# Patient Record
Sex: FEMALE | Race: WHITE
[De-identification: names, ages, dates, MRNs, and addresses within clinical notes are randomized per-mention and may not be internally consistent; named-entity substitution may affect disease eponyms.]

---

## 2019-03-04 ENCOUNTER — HOSPITAL ENCOUNTER (EMERGENCY)
Dept: HOSPITAL 97 - ER | Age: 34
Discharge: HOME | End: 2019-03-04
Payer: SELF-PAY

## 2019-03-04 VITALS — SYSTOLIC BLOOD PRESSURE: 135 MMHG | DIASTOLIC BLOOD PRESSURE: 90 MMHG | TEMPERATURE: 100.3 F | OXYGEN SATURATION: 100 %

## 2019-03-04 DIAGNOSIS — J06.9: Primary | ICD-10-CM

## 2019-03-04 DIAGNOSIS — J10.1: ICD-10-CM

## 2019-03-04 PROCEDURE — 87081 CULTURE SCREEN ONLY: CPT

## 2019-03-04 PROCEDURE — 87070 CULTURE OTHR SPECIMN AEROBIC: CPT

## 2019-03-04 PROCEDURE — 99283 EMERGENCY DEPT VISIT LOW MDM: CPT

## 2019-03-04 PROCEDURE — 96372 THER/PROPH/DIAG INJ SC/IM: CPT

## 2019-03-04 PROCEDURE — 87804 INFLUENZA ASSAY W/OPTIC: CPT

## 2019-03-04 NOTE — EDPHYS
Physician Documentation                                                                           

 [facility]                                                                

Name: Clau Colin                                                                              

Age: 33 yrs                                                                                       

Sex: Female                                                                                       

: 1985                                                                                   

MRN: [id number]                                                                                   

Arrival Date: 2019                                                                          

Time: 17:28                                                                                       

Account#: [id number]                                                                            

Bed 10                                                                                            

Private MD:                                                                                       

ED Physician Delvin Sanford                                                                    

HPI:                                                                                              

                                                                                             

17:55 This 33 yrs old  Female presents to ER via Ambulatory with complaints of Flu   ma2 

      Symptoms.                                                                                   

17:55 The patient or guardian reports cough. Onset: The symptoms/episode began/occurred       ma2 

      gradually, 2 day(s) ago. Severity of symptoms: At their worst the symptoms were             

      moderate, in the emergency department the symptoms are unchanged. Associated signs and      

      symptoms: Pertinent positives: rhinorrhea, sore throat, Pertinent negatives: diarrhea,      

      fever, nausea. The patient has not experienced similar symptoms in the past.                

                                                                                                  

Historical:                                                                                       

- Allergies:                                                                                      

17:39 No Known Allergies;                                                                     ss  

- Home Meds:                                                                                      

17:39 None [Active];                                                                          ss  

- PMHx:                                                                                           

17:39 ectopic pregnancy; pituitary tumor;                                                     ss  

- PSHx:                                                                                           

17:39 Appendectomy;                                                                           ss  

                                                                                                  

- Immunization history:: Adult Immunizations up to date.                                          

- Social history:: Smoking status: Patient/guardian denies using tobacco,                         

  Patient/guardian denies using alcohol, street drugs, The patient lives with family.             

- Ebola Screening: : Patient denies exposure to infectious person Patient denies travel           

  to an Ebola-affected area in the 21 days before illness onset.                                  

- Family history:: not pertinent.                                                                 

                                                                                                  

                                                                                                  

ROS:                                                                                              

17:55 Constitutional: Negative for fever, chills, and weight loss.                            ma2 

17:55 ENT: Positive for nasal discharge, Negative for foreign body sensation, hearing loss.       

17:55 All other systems are negative.                                                             

                                                                                                  

Exam:                                                                                             

17:55 Constitutional:  This is a well developed, well nourished patient who is awake, alert,  ma2 

      and in no acute distress. Head/Face:  Normocephalic, atraumatic.                            

17:55 Chest/axilla:  Normal chest wall appearance and motion.  Nontender with no deformity.       

      No lesions are appreciated. Cardiovascular:  Regular rate and rhythm with a normal S1       

      and S2.  No gallops, murmurs, or rubs.  Normal PMI, no JVD.  No pulse deficits.             

      Respiratory:  Lungs have equal breath sounds bilaterally, clear to auscultation and         

      percussion.  No rales, rhonchi or wheezes noted.  No increased work of breathing, no        

      retractions or nasal flaring. Abdomen/GI:  Soft, non-tender, with normal bowel sounds.      

      No distension or tympany.  No guarding or rebound.  No evidence of tenderness               

      throughout. Neuro:  Awake and alert, GCS 15, oriented to person, place, time, and           

      situation.  Cranial nerves II-XII grossly intact.  Motor strength 5/5 in all                

      extremities.  Sensory grossly intact.  Cerebellar exam normal.  Normal gait.                

17:55 ENT: TM's: are normal, Nose: is normal, Mouth: is normal, Posterior pharynx: Tonsils:       

      bilaterally enlarged, with exudate, swelling, erythema, that is mild, peritonsillar         

      mass, is not appreciated, pooling of secretions, is not appreciated.                        

                                                                                                  

Vital Signs:                                                                                      

17:39  / 90; Pulse 121; Resp 17; Temp 100.3(O); Pulse Ox 100% on R/A; Weight 83.91 kg;  ss  

      Height 5 ft. 7 in. (170.18 cm); Pain 10/10;                                                 

19:11 Pulse 102; Resp 18; Pulse Ox 100% on R/A;                                               aj1 

17:39 Body Mass Index 28.97 (83.91 kg, 170.18 cm)                                             ss  

                                                                                                  

MDM:                                                                                              

17:41 Patient medically screened.                                                             [facility] 

17:55 Differential Diagnosis: Influenza Upper Respiratory Infection Sinusitis. Data reviewed: [facility] 

      vital signs, nurses notes, radiologic studies. Counseling: I had a detailed discussion      

      with the patient and/or guardian regarding: the historical points, exam findings, and       

      any diagnostic results supporting the discharge/admit diagnosis, the presence of at         

      least one elevated blood pressure reading (>120/80) during this emergency department        

      visit, the need for outpatient follow up. Response to treatment: the patient's symptoms     

      have markedly improved after treatment.                                                     

                                                                                                  

                                                                                             

17:41 Order name: Flu; Complete Time: 18:50                                                   ma2 

                                                                                             

17:41 Order name: Strep; Complete Time: 18:50                                                 ma2 

                                                                                             

18:39 Order name: Throat Culture                                                              EDMS

                                                                                                  

Administered Medications:                                                                         

18:11 Drug: TORadol 60 mg Route: IM; Site: right gluteus;                                     aj1 

19:11 Follow up: Response: No adverse reaction                                                [facility] 

18:11 Drug: MethylPREDNISolone Sodium Succinate 125 mg Route: IM; Site: left gluteus;         aj1 

19:11 Follow up: Response: No adverse reaction                                                aj1 

                                                                                                  

                                                                                                  

Disposition:                                                                                      

19 18:50 Discharged to Home. Impression: Acute upper respiratory infection, unspecified,    

  Influenza due to certain identified influenza viruses.                                          

- Condition is Stable.                                                                            

- Discharge Instructions: Influenza, Adult, Upper Respiratory Infection, Adult.                   

- Prescriptions for Amoxicillin 125 mg/5 mL Oral Suspension for Reconstitution - take 5           

  milliliter by ORAL route every 8 hours for 10 days; 150 milliliter. Tylenol- Codeine            

  #3 300-30 mg Oral Tablet - take 2 tablet by ORAL route every 6 hours As needed; 30              

  tablet. Medrol (Inder) 4 mg Oral Tablets, Dose Pack - take 1 tablet by ORAL route as              

  directed - follow package instructions; 1 packet. Tamiflu 75 mg Oral Capsule - take 1           

  tablet by ORAL route every 12 hours for 5 days; 10 tablet.                                      

- Medication Reconciliation Form, Thank You Letter, Antibiotic Education, Prescription            

  Opioid Use form.                                                                                

- Follow up: Private Physician; When: Tomorrow; Reason: Continuance of care.                      

                                                                                                  

                                                                                                  

                                                                                                  

Signatures:                                                                                       

[facility]                                                 

Estelle Mckeon RN RN   aj1                                                  

Mounika Dalton RN RN                                                      

Delvin Sanford MD MD   ma2                                                  

                                                                                                  

Corrections: (The following items were deleted from the chart)                                    

18:50 18:50 2019 18:50 Discharged to Home. Impression: Acute upper respiratory          ma2 

      infection, unspecified. Condition is Stable. Discharge Instructions: Upper Respiratory      

      Infection, Adult. Prescriptions for Amoxicillin 125 mg/5 mL Oral Suspension for             

      Reconstitution - take 5 milliliter by ORAL route every 8 hours for 10 days; 150             

      milliliter, Tylenol-Codeine #3 300-30 mg Oral Tablet - take 2 tablet by ORAL route          

      every 6 hours As needed; 30 tablet, Medrol (Inder) 4 mg Oral Tablets, Dose Pack - take 1      

      tablet by ORAL route as directed - follow package instructions; 1 packet. and Forms are     

      Medication Reconciliation Form, Thank You Letter, Antibiotic Education, Prescription        

      Opioid Use. Follow up: Private Physician; When: Tomorrow; Reason: Continuance of care.      

      ma2                                                                                         

19:12 18:50 2019 18:50 Discharged to Home. Impression: Acute upper respiratory          aj1 

      infection, unspecified; Influenza due to certain identified influenza viruses.              

      Condition is Stable. Discharge Instructions: Upper Respiratory Infection, Adult.            

      Prescriptions for Amoxicillin 125 mg/5 mL Oral Suspension for Reconstitution - take 5       

      milliliter by ORAL route every 8 hours for 10 days; 150 milliliter, Tylenol-Codeine #3      

      300-30 mg Oral Tablet - take 2 tablet by ORAL route every 6 hours As needed; 30 tablet,     

      Medrol (Inder) 4 mg Oral Tablets, Dose Pack - take 1 tablet by ORAL route as directed -       

      follow package instructions; 1 packet. and Forms are Medication Reconciliation Form,        

      Thank You Letter, Antibiotic Education, Prescription Opioid Use. Follow up: Private         

      Physician; When: Tomorrow; Reason: Continuance of care. ma2                                 

                                                                                                  

**************************************************************************************************

## 2019-03-04 NOTE — ER
Nurse's Notes                                                                                     

 [facility]                                                                

Name: Clau Colin                                                                              

Age: 33 yrs                                                                                       

Sex: Female                                                                                       

: 1985                                                                                   

MRN: [id number]                                                                                   

Arrival Date: 2019                                                                          

Time: 17:28                                                                                       

Account#: [id number]                                                                            

Bed 10                                                                                            

Private MD:                                                                                       

Diagnosis: Acute upper respiratory infection, unspecified;Influenza due to certain identified     

  influenza viruses                                                                               

                                                                                                  

Presentation:                                                                                     

                                                                                             

17:37 Presenting complaint: Patient states: fever, cough, body aches and chills that began    ss  

      yesterday. Transition of care: patient was not received from another setting of care.       

      Onset of symptoms was March 3, 2019. Risk Assessment: Do you want to hurt yourself or       

      someone else? Patient reports no desire to harm self or others. Initial Sepsis Screen:      

      Does the patient meet any 2 criteria? No. Patient's initial sepsis screen is negative.      

      Does the patient have a suspected source of infection? No. Patient's initial sepsis         

      screen is negative. Note Pt reports she has not taken any antipyretics today because        

      she believes they are not working for her. Care prior to arrival: None.                     

17:37 Method Of Arrival: Ambulatory                                                           ss  

17:37 Acuity: RIC 4                                                                           ss  

                                                                                                  

Historical:                                                                                       

- Allergies:                                                                                      

17:39 No Known Allergies;                                                                     ss  

- Home Meds:                                                                                      

17:39 None [Active];                                                                          ss  

- PMHx:                                                                                           

17:39 ectopic pregnancy; pituitary tumor;                                                     ss  

- PSHx:                                                                                           

17:39 Appendectomy;                                                                           ss  

                                                                                                  

- Immunization history:: Adult Immunizations up to date.                                          

- Social history:: Smoking status: Patient/guardian denies using tobacco,                         

  Patient/guardian denies using alcohol, street drugs, The patient lives with family.             

- Ebola Screening: : Patient denies exposure to infectious person Patient denies travel           

  to an Ebola-affected area in the 21 days before illness onset.                                  

- Family history:: not pertinent.                                                                 

                                                                                                  

                                                                                                  

Screenin:18 Abuse screen: Denies threats or abuse. Denies injuries from another. Nutritional        aj1 

      screening: No deficits noted. Tuberculosis screening: No symptoms or risk factors           

      identified.                                                                                 

                                                                                                  

Assessment:                                                                                       

18:18 General: Appears in no apparent distress. uncomfortable, Behavior is calm, cooperative, aj1 

      appropriate for age. Pain: Complains of pain in left aspect of posterior pharynx and        

      right aspect of posterior pharynx. Neuro: Level of Consciousness is awake, alert, obeys     

      commands. Cardiovascular: Patient's skin is warm and dry. Respiratory: Airway is patent     

      Respiratory effort is even, unlabored, Respiratory pattern is regular, symmetrical,         

      Breath sounds are clear bilaterally. Respiratory: Reports cough that is hacking,            

      persistent. GI: No signs and/or symptoms were reported involving the gastrointestinal       

      system. : No signs and/or symptoms were reported regarding the genitourinary system.      

      EENT: Throat is reddened has patchy exudate has enlarged tonsils bilaterally. Derm: No      

      signs and/or symptoms reported regarding the dermatologic system. Skin is pink, warm \T\    

      dry. normal. Musculoskeletal: No signs and/or symptoms reported regarding the               

      musculoskeletal system. Circulation, motion, and sensation intact.                          

19:11 Reassessment: Patient appears in no apparent distress at this time. No changes from     aj1 

      previously documented assessment. Patient and/or family updated on plan of care and         

      expected duration. Pain level reassessed. Patient is alert, oriented x 3, equal             

      unlabored respirations, skin warm/dry/pink.                                                 

                                                                                                  

Vital Signs:                                                                                      

17:39  / 90; Pulse 121; Resp 17; Temp 100.3(O); Pulse Ox 100% on R/A; Weight 83.91 kg;  ss  

      Height 5 ft. 7 in. (170.18 cm); Pain 10/10;                                                 

19:11 Pulse 102; Resp 18; Pulse Ox 100% on R/A;                                               aj1 

17:39 Body Mass Index 28.97 (83.91 kg, 170.18 cm)                                               

                                                                                                  

ED Course:                                                                                        

17:28 Patient arrived in ED.                                                                  as  

17:38 Triage completed.                                                                       ss  

17:39 Arm band placed on right wrist.                                                         ss  

17:41 Delvin Sanford MD is Attending Physician.                                           ma2 

17:46 Estelle Mckeon, RN is Primary Nurse.                                                   aj1 

18:18 Patient has correct armband on for positive identification. Bed in low position. Call   aj1 

      light in reach. Side rails up X 1.                                                          

18:18 No provider procedures requiring assistance completed.                                  aj1 

19:11 Patient did not have IV access during this emergency room visit.                        aj1 

                                                                                                  

Administered Medications:                                                                         

18:11 Drug: TORadol 60 mg Route: IM; Site: right gluteus;                                     aj1 

19:11 Follow up: Response: No adverse reaction                                                aj1 

18:11 Drug: MethylPREDNISolone Sodium Succinate 125 mg Route: IM; Site: left gluteus;         aj1 

19:11 Follow up: Response: No adverse reaction                                                aj1 

                                                                                                  

                                                                                                  

Outcome:                                                                                          

18:50 Discharge ordered by MD.                                                                ma2 

19:12 Discharged to home ambulatory.                                                          aj1 

19:12 Condition: good                                                                             

19:12 Discharge instructions given to patient, Instructed on discharge instructions, follow       

      up and referral plans. no drinking with medication, no driving heavy equipment,             

      medication usage, Demonstrated understanding of instructions, follow-up care,               

      medications, Prescriptions given X 4.                                                       

19:12 Patient left the ED.                                                                    aj1 

                                                                                                  

Signatures:                                                                                       

Estelle Mckeon RN RN   aj1                                                  

Rosi Pereira Shelby, RN RN                                                      

Delvin Sanford MD MD ma2                                                  

                                                                                                  

**************************************************************************************************

## 2022-01-09 ENCOUNTER — HOSPITAL ENCOUNTER (EMERGENCY)
Dept: HOSPITAL 97 - ER | Age: 37
Discharge: HOME | End: 2022-01-09
Payer: SELF-PAY

## 2022-01-09 VITALS — DIASTOLIC BLOOD PRESSURE: 80 MMHG | SYSTOLIC BLOOD PRESSURE: 130 MMHG | OXYGEN SATURATION: 100 % | TEMPERATURE: 98.4 F

## 2022-01-09 DIAGNOSIS — U07.1: Primary | ICD-10-CM

## 2022-01-09 DIAGNOSIS — Z88.6: ICD-10-CM

## 2022-01-09 LAB — SARS-COV-2 RNA RESP QL NAA+PROBE: POSITIVE

## 2022-01-09 PROCEDURE — 87081 CULTURE SCREEN ONLY: CPT

## 2022-01-09 PROCEDURE — 0240U: CPT

## 2022-01-09 PROCEDURE — 87070 CULTURE OTHR SPECIMN AEROBIC: CPT

## 2022-01-09 PROCEDURE — 99283 EMERGENCY DEPT VISIT LOW MDM: CPT

## 2022-01-09 NOTE — ER
Nurse's Notes                                                                                     

 [facility]                                                                 

Name: Clau Colin                                                                              

Age: 36 yrs                                                                                       

Sex: Female                                                                                       

: 1985                                                                                   

MRN: [id number]                                                                                   

Arrival Date: 2022                                                                          

Time: 11:22                                                                                       

Account#: [id number]                                                                            

Bed 10                                                                                            

Private MD:                                                                                       

Diagnosis: Coronavirus infection, unspecified                                                     

                                                                                                  

Presentation:                                                                                     

                                                                                             

11:38 Chief complaint: Patient states: she has had a sore throat, pain when swallowing and    ap3 

      speaking, ear pain, chills, body aches and dry cough for approx 5 days. Coronavirus         

      screen: Vaccine status: Patient reports receiving the 1st dose of the Covid vaccine.        

      chills, cough unrelated to allergies, fatigue, runny nose, sore throat, Client presents     

      with at least one sign or symptom that may indicate coronavirus-19. Standard/surgical       

      mask placed on the client. Provider contacted for isolation considerations. Ebola           

      Screen: No symptoms or risks identified at this time. Initial Sepsis Screen: Does the       

      patient meet any 2 criteria? No. Patient's initial sepsis screen is negative. Does the      

      patient have a suspected source of infection? No. Patient's initial sepsis screen is        

      negative. Risk Assessment: Do you want to hurt yourself or someone else? Patient            

      reports no desire to harm self or others. Onset of symptoms was 2022.           

11:38 Method Of Arrival: Ambulatory                                                           ap3 

11:38 Acuity: RIC 4                                                                           ap3 

                                                                                                  

Triage Assessment:                                                                                

11:40 General: Appears comfortable, Behavior is calm, cooperative, appropriate for age. Pain: ap3 

      Complains of pain in throat, ear, and generalized body aches. EENT: Reports nasal           

      congestion pain when swallowing. EENT:. Neuro: Level of Consciousness is awake, alert,      

      obeys commands, Oriented to person, place, time, situation, Appropriate for age Gait is     

      steady, Speech is normal. Respiratory: Reports cough that is dry, Airway is patent          

      Respiratory effort is even, unlabored, Respiratory pattern is regular, symmetrical.         

                                                                                                  

OB/GYN:                                                                                           

11:42 LMP 12/10/2021                                                                          ap3 

                                                                                                  

Historical:                                                                                       

- Allergies:                                                                                      

11:40 Aspirin;                                                                                ap3 

- PMHx:                                                                                           

11:40 ectopic pregnancy; pituitary tumor;                                                     ap3 

- PSHx:                                                                                           

11:40 None;                                                                                   ap3 

                                                                                                  

- Immunization history:: Adult Immunizations up to date.                                          

- Social history:: Smoking status: Patient denies any tobacco usage or history of.                

  Patient uses alcohol, only on a social basis.                                                   

                                                                                                  

                                                                                                  

Screenin:42 Abuse screen: Denies threats or abuse. Nutritional screening: No deficits noted.        ap3 

      Tuberculosis screening: No symptoms or risk factors identified. Fall Risk None              

      identified.                                                                                 

                                                                                                  

Vital Signs:                                                                                      

11:38  / 80; Pulse 77; Resp 18; Temp 98.4; Pulse Ox 100% ; Weight 90.72 kg; Height 5    ap3 

      ft. 8 in. (172.72 cm);                                                                      

11:38 Body Mass Index 30.41 (90.72 kg, 172.72 cm)                                             ap3 

                                                                                                  

ED Course:                                                                                        

11:22 Patient arrived in ED.                                                                  am2 

11:34 Justin Busyb PA is PHCP.                                                              [email or web address] 

11:34 Delvin Sanford MD is Attending Physician.                                           [facility] 

11:35 Cece Duke, RN is Primary Nurse.                                                  ap3 

11:40 Triage completed.                                                                       ap3 

11:42 Arm band placed on right wrist.                                                         ap3 

11:43 Patient has correct armband on for positive identification. Placed in gown. Call light  ap3 

      in reach. Pulse ox on. NIBP on. Door closed. Noise minimized.                               

13:30 No provider procedures requiring assistance completed. Patient did not have IV access   ap3 

      during this emergency room visit.                                                           

                                                                                                  

Administered Medications:                                                                         

No medications were administered                                                                  

                                                                                                  

                                                                                                  

Outcome:                                                                                          

13:18 Discharge ordered by MD.                                                                [facility] 

13:30 Discharged to home ambulatory.                                                          ap3 

13:30 Condition: good                                                                             

13:30 Discharge instructions given to patient, Instructed on discharge instructions,              

      Demonstrated understanding of instructions, follow-up care.                                 

13:31 Patient left the ED.                                                                    ap3 

                                                                                                  

Signatures:                                                                                       

Justin Busby PA PA jmm Moreno, Amanda                               amCece Barrett, RN                    RN   ap3                                                  

                                                                                                  

**************************************************************************************************

## 2022-01-09 NOTE — EDPHYS
Physician Documentation                                                                           

 [facility]                                                                 

Name: Clau Colin                                                                              

Age: 36 yrs                                                                                       

Sex: Female                                                                                       

: 1985                                                                                   

MRN: [id number]                                                                                   

Arrival Date: 2022                                                                          

Time: 11:22                                                                                       

Account#: [id number]                                                                            

Bed 10                                                                                            

Private MD:                                                                                       

ED Physician Delvin Sanford                                                                    

HPI:                                                                                              

                                                                                             

13:01 This 36 yrs old  Female presents to ER via Ambulatory with complaints of        jmm 

      Difficulty Swallowing, Neck Pain, <24hrs Old, Ear Pain, Cough.                              

13:01 The patient presents with sore throat. Onset: The symptoms/episode began/occurred       jmm 

      gradually, 3 day(s) ago. Modifying factors: The symptoms are alleviated by nothing, the     

      symptoms are aggravated by swallowing. Associated signs and symptoms: Pertinent             

      positives: cough. Associated signs and symptoms: Pertinent positives: earache. The          

      patient has experienced similar episodes in the past.                                       

                                                                                                  

OB/GYN:                                                                                           

11:42 LMP 12/10/2021                                                                          ap3 

                                                                                                  

Historical:                                                                                       

- Allergies:                                                                                      

11:40 Aspirin;                                                                                ap3 

- PMHx:                                                                                           

11:40 ectopic pregnancy; pituitary tumor;                                                     ap3 

- PSHx:                                                                                           

11:40 None;                                                                                   ap3 

                                                                                                  

- Immunization history:: Adult Immunizations up to date.                                          

- Social history:: Smoking status: Patient denies any tobacco usage or history of.                

  Patient uses alcohol, only on a social basis.                                                   

                                                                                                  

                                                                                                  

ROS:                                                                                              

13:01 Constitutional: Positive for body aches, chills.                                        jmm 

13:01 ENT: Positive for sore throat.                                                              

13:01 Respiratory: Positive for cough.                                                            

13:01 All other systems are negative.                                                             

                                                                                                  

Exam:                                                                                             

13:01 Constitutional:  This is a well developed, well nourished patient who is awake, alert,  jmm 

      and in no acute distress. Head/Face:  atraumatic. Eyes:  EOMI, no conjunctival erythema     

      appreciated                                                                                 

13:01 Neck:  Trachea midline, Supple Chest/axilla:  Normal chest wall appearance and motion.      

       Cardiovascular:  Regular rate and rhythm.  No edema appreciated Respiratory:  Normal       

      respirations, no respiratory distress appreciated Abdomen/GI:  Non distended, soft          

      Back:  Normal ROM Skin:  General appearance color normal MS/ Extremity:  Moves all          

      extremities, no obvious deformities appreciated, no edema noted to the lower                

      extremities  Neuro:  Awake and alert, normal gait Psych:  Behavior is normal, Mood is       

      normal, Patient is cooperative and pleasant                                                 

13:01 ENT: TM's: are normal, Posterior pharynx: erythema, that is mild.                           

                                                                                                  

Vital Signs:                                                                                      

11:38  / 80; Pulse 77; Resp 18; Temp 98.4; Pulse Ox 100% ; Weight 90.72 kg; Height 5    ap3 

      ft. 8 in. (172.72 cm);                                                                      

11:38 Body Mass Index 30.41 (90.72 kg, 172.72 cm)                                             ap3 

                                                                                                  

MDM:                                                                                              

11:58 Patient medically screened.                                                             [facility] 

13:11 Data reviewed: vital signs, nurses notes. Counseling: I had a detailed discussion with  lyudmila 

      the patient and/or guardian regarding: the historical points, exam findings, and any        

      diagnostic results supporting the discharge/admit diagnosis, lab results, the need for      

      outpatient follow up, to return to the emergency department if symptoms worsen or           

      persist or if there are any questions or concerns that arise at home.                       

                                                                                                  

                                                                                             

11:36 Order name: Strep; Complete Time: 12:50                                                 ap3 

                                                                                             

11:36 Order name: COVID-19/FLU A+B (Document "Date of Onset" if Symptomatic); Complete Time:  ap3 

      13:19                                                                                       

                                                                                             

12:50 Order name: Throat Culture                                                              EDMS

                                                                                                  

Administered Medications:                                                                         

No medications were administered                                                                  

                                                                                                  

                                                                                                  

Disposition Summary:                                                                              

22 13:18                                                                                    

Discharge Ordered                                                                                 

      Location: Home                                                                          [facility] 

      Condition: Stable                                                                       [facility] 

      Diagnosis                                                                                   

        - Coronavirus infection, unspecified                                                  [facility] 

      Followup:                                                                               [facility] 

        - With: Private Physician                                                                  

        - When: 2 - 3 days                                                                         

        - Reason: Recheck today's complaints, Continuance of care, Re-evaluation by your           

      physician                                                                                   

      Discharge Instructions:                                                                     

        - Discharge Summary Sheet                                                             [facility] 

        - COVID-19                                                                            [facility] 

      Forms:                                                                                      

        - Medication Reconciliation Form                                                      [facility] 

        - Thank You Letter                                                                    [facility] 

        - Antibiotic Education                                                                [facility] 

        - Prescription Opioid Use                                                             [facility] 

Signatures:                                                                                       

Dispatcher [employer]                           EDMS                                                 

Justin Busby PA PA jmm Prokisch, Amanda, RN                    RN   ap3                                                  

                                                                                                  

**************************************************************************************************